# Patient Record
Sex: FEMALE | Race: WHITE | NOT HISPANIC OR LATINO | URBAN - METROPOLITAN AREA
[De-identification: names, ages, dates, MRNs, and addresses within clinical notes are randomized per-mention and may not be internally consistent; named-entity substitution may affect disease eponyms.]

---

## 2020-12-30 ENCOUNTER — OFFICE (OUTPATIENT)
Dept: URBAN - METROPOLITAN AREA CLINIC 106 | Facility: CLINIC | Age: 55
End: 2020-12-30
Payer: COMMERCIAL

## 2020-12-30 DIAGNOSIS — K29.70 GASTRITIS, UNSPECIFIED, WITHOUT BLEEDING: ICD-10-CM

## 2020-12-30 DIAGNOSIS — K31.9 DISEASE OF STOMACH AND DUODENUM, UNSPECIFIED: ICD-10-CM

## 2020-12-30 PROCEDURE — 88312 SPECIAL STAINS GROUP 1: CPT

## 2020-12-30 PROCEDURE — 88313 SPECIAL STAINS GROUP 2: CPT

## 2020-12-30 PROCEDURE — 88305 TISSUE EXAM BY PATHOLOGIST: CPT

## 2021-03-30 ENCOUNTER — OFFICE (OUTPATIENT)
Dept: URBAN - METROPOLITAN AREA CLINIC 106 | Facility: CLINIC | Age: 56
End: 2021-03-30

## 2021-03-30 VITALS
HEART RATE: 88 BPM | SYSTOLIC BLOOD PRESSURE: 126 MMHG | TEMPERATURE: 97.5 F | HEIGHT: 64 IN | DIASTOLIC BLOOD PRESSURE: 72 MMHG | WEIGHT: 170 LBS

## 2021-03-30 DIAGNOSIS — K76.0 FATTY (CHANGE OF) LIVER, NOT ELSEWHERE CLASSIFIED: ICD-10-CM

## 2021-03-30 DIAGNOSIS — R10.13 EPIGASTRIC PAIN: ICD-10-CM

## 2021-03-30 PROCEDURE — 99214 OFFICE O/P EST MOD 30 MIN: CPT | Performed by: INTERNAL MEDICINE

## 2021-03-30 NOTE — SERVICEHPINOTES
The patient is seen today for follow-up regarding epigastric abdominal pain.In the interim since her last appointment, she underwent an EGD on 12/30/20 which was remarkable for changes of gastritis in the antrum and prepyloric region of the stomach. Biopsies were without evidence of underlying H. 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 BRHer most recent abdominal ultrasound done on 3/15/21 was remarkable for surgical absence of the gallbladder and mild hepatomegaly with diffuse steatosis. Blood work done on 9/10/20 demonstrated normal liver enzymes with the exception of an .Today, she reports that she had blood work done through Dr. Harrington's office and found to have a low iron level. She was placed on an oral iron supplement but stopped it due to GI upset. BRCurrently, she is feeling well and reports her previous epigastric discomfort has since resolved - she voices no new issues/complaints today and continues to be enrolled in one of our YORK clinical trials.

## 2021-03-30 NOTE — SERVICENOTES
I will get a copy of her recent blood work she had done through Dr. Harrington's office for review - we discussed that if she could not tolerate an oral iron supplement then we can consider IV iron. However, given her underlying PV, that is not likely necessary and I told her that I would leave that up to her hematologist.
For now, I will have her return to see me for follow-up in 6 months or sooner if needed.